# Patient Record
Sex: FEMALE
[De-identification: names, ages, dates, MRNs, and addresses within clinical notes are randomized per-mention and may not be internally consistent; named-entity substitution may affect disease eponyms.]

---

## 2018-02-18 ENCOUNTER — HOSPITAL ENCOUNTER (EMERGENCY)
Dept: HOSPITAL 14 - H.ER | Age: 23
Discharge: HOME | End: 2018-02-18
Payer: COMMERCIAL

## 2018-02-18 VITALS
HEART RATE: 60 BPM | SYSTOLIC BLOOD PRESSURE: 121 MMHG | OXYGEN SATURATION: 98 % | DIASTOLIC BLOOD PRESSURE: 80 MMHG | RESPIRATION RATE: 18 BRPM | TEMPERATURE: 98.6 F

## 2018-02-18 DIAGNOSIS — K52.9: Primary | ICD-10-CM

## 2018-02-18 LAB
ALBUMIN SERPL-MCNC: 4.5 G/DL (ref 3.5–5)
ALBUMIN/GLOB SERPL: 1.1 {RATIO} (ref 1–2.1)
ALT SERPL-CCNC: 42 U/L (ref 9–52)
AST SERPL-CCNC: 32 U/L (ref 14–36)
BASOPHILS # BLD AUTO: 0 K/UL (ref 0–0.2)
BASOPHILS NFR BLD: 0.2 % (ref 0–2)
BUN SERPL-MCNC: 17 MG/DL (ref 7–17)
CALCIUM SERPL-MCNC: 9 MG/DL (ref 8.4–10.2)
EOSINOPHIL # BLD AUTO: 0 K/UL (ref 0–0.7)
EOSINOPHIL NFR BLD: 0.1 % (ref 0–4)
ERYTHROCYTE [DISTWIDTH] IN BLOOD BY AUTOMATED COUNT: 13.6 % (ref 11.5–14.5)
GFR NON-AFRICAN AMERICAN: > 60
HGB BLD-MCNC: 13.5 G/DL (ref 12–16)
LYMPHOCYTE: 3 % (ref 20–50)
LYMPHOCYTES # BLD AUTO: 0.3 K/UL (ref 1–4.3)
LYMPHOCYTES NFR BLD AUTO: 1.8 % (ref 20–40)
MCH RBC QN AUTO: 30.6 PG (ref 27–31)
MCHC RBC AUTO-ENTMCNC: 33.5 G/DL (ref 33–37)
MCV RBC AUTO: 91.4 FL (ref 81–99)
MONOCYTE: 7 % (ref 0–10)
MONOCYTES # BLD: 1 K/UL (ref 0–0.8)
MONOCYTES NFR BLD: 5.9 % (ref 0–10)
NEUTROPHILS # BLD: 15.3 K/UL (ref 1.8–7)
NEUTROPHILS NFR BLD AUTO: 90 % (ref 42–75)
NEUTROPHILS NFR BLD AUTO: 92 % (ref 50–75)
NRBC BLD AUTO-RTO: 0.1 % (ref 0–0)
PLATELET # BLD EST: NORMAL 10*3/UL
PLATELET # BLD: 254 K/UL (ref 130–400)
PMV BLD AUTO: 9 FL (ref 7.2–11.7)
RBC # BLD AUTO: 4.42 MIL/UL (ref 3.8–5.2)
RBC MORPH BLD: NORMAL
TOTAL CELLS COUNTED BLD: 100
WBC # BLD AUTO: 16.7 K/UL (ref 4.8–10.8)

## 2018-02-18 PROCEDURE — 81025 URINE PREGNANCY TEST: CPT

## 2018-02-18 PROCEDURE — 99284 EMERGENCY DEPT VISIT MOD MDM: CPT

## 2018-02-18 PROCEDURE — 74177 CT ABD & PELVIS W/CONTRAST: CPT

## 2018-02-18 PROCEDURE — 80053 COMPREHEN METABOLIC PANEL: CPT

## 2018-02-18 PROCEDURE — 96374 THER/PROPH/DIAG INJ IV PUSH: CPT

## 2018-02-18 PROCEDURE — 85025 COMPLETE CBC W/AUTO DIFF WBC: CPT

## 2018-02-18 NOTE — CT
PROCEDURE:  CT Abdomen and Pelvis without intravenous contrast



HISTORY:

RLQ pain



COMPARISON:

None.



TECHNIQUE:

Technique. 



Contrast Dose: 



Radiation dose:



Total exam DLP =  mGy-cm.



This CT exam was performed using one or more of the following dose 

reduction techniques: Automated exposure control, adjustment of the 

mA and/or kV according to patient size, and/or use of iterative 

reconstruction technique.



FINDINGS:



LOWER THORAX:

Unremarkable. 



LIVER:

Unremarkable. No gross lesion or ductal dilatation.  



GALLBLADDER AND BILE DUCTS:

Unremarkable. 



PANCREAS:

Unremarkable. No gross lesion or ductal dilatation.



SPLEEN:

Unremarkable. 



ADRENALS:

Unremarkable. No mass. 



KIDNEYS AND URETERS:

Unremarkable. No hydronephrosis. No solid mass. 



VASCULATURE:

Unremarkable. No aortic aneurysm. 



BOWEL:

Unremarkable. No obstruction. No gross mural thickening. 



APPENDIX:

Unremarkable. Normal appendix. 



PERITONEUM:

Unremarkable. No free fluid. No free air. 



LYMPH NODES:

Unremarkable. No enlarged lymph nodes. 



BLADDER:

Unremarkable. 



REPRODUCTIVE:

Unremarkable. 



BONES:

No acute fracture. 



OTHER FINDINGS:

13 millimeter left ovarian cyst. .



IMPRESSION:

13 millimeter left ovarian cyst.

## 2018-02-18 NOTE — ED PDOC
HPI: Abdomen


Time Seen by Provider: 02/18/18 07:04


Chief Complaint (Nursing): GI Problem


History Per: Patient


Onset/Duration Of Symptoms: Days (1)


Current Symptoms Are (Timing): Still Present


Severity: Moderate


Location Of Pain/Discomfort: RLQ


Quality Of Discomfort: Unable To Describe


Associated Symptoms: Chills, Nausea, Vomiting, Diarrhea.  denies: Urinary 

Symptoms


Alleviating Factors: None


Additional Complaint(s): 





NVD assoc with RLQ abd pain since last night no fever. Chill. No dysuria





Past Medical History


Vital Signs: 


 Last Vital Signs











Temp  98.6 F   02/18/18 06:52


 


Pulse  60   02/18/18 06:52


 


Resp  18   02/18/18 06:52


 


BP  121/80   02/18/18 06:52


 


Pulse Ox  98   02/18/18 11:00














- Medical History


PMH: No Chronic Diseases





- Family History


Family History: States: Unknown Family Hx





- Home Medications


Home Medications: 


 Ambulatory Orders











 Medication  Instructions  Recorded


 


Ondansetron [Zofran] 4 mg PO Q8H #10 tab 02/18/18














- Allergies


Allergies/Adverse Reactions: 


 Allergies











Allergy/AdvReac Type Severity Reaction Status Date / Time


 


amoxicillin Allergy  RASH Verified 02/18/18 06:52














Review of Systems


ROS Statement: Except As Marked, All Systems Reviewed And Found Negative


Constitutional: Positive for: Chills.  Negative for: Fever


Gastrointestinal: Positive for: Nausea, Vomiting, Abdominal Pain, Diarrhea


Genitourinary Female: Negative for: Dysuria, Frequency


Musculoskeletal: Negative for: Back Pain





Physical Exam





- Physical Exam


Appears: Positive for: Non-toxic, No Acute Distress


Skin: Positive for: Normal Color, Warm, DRY


ENT: Positive for: Other (Mucous membranes dry)


Cardiovascular/Chest: Positive for: Regular Rate, Rhythm


Respiratory: Positive for: CNT, Normal Breath Sounds


Gastrointestinal/Abdominal: Positive for: Bowel Sounds, Soft, Tenderness (RLQ)


Back: Negative for: L CVA Tenderness, R CVA Tenderness


Extremity: Positive for: Normal ROM


Neurologic/Psych: Positive for: Alert, Oriented.  Negative for: Motor/Sensory 

Deficits





- Laboratory Results


Result Diagrams: 


 02/18/18 07:55





 02/18/18 07:55





- ECG


O2 Sat by Pulse Oximetry: 98





Medical Decision Making


Medical Decision Making: 





PROCEDURE:  CT Abdomen and Pelvis without intravenous contrast


HISTORY:


RLQ pain


FINDINGS:


LOWER THORAX:


Unremarkable. 


LIVER:


Unremarkable. No gross lesion or ductal dilatation.  


GALLBLADDER AND BILE DUCTS:


Unremarkable. 


PANCREAS:


Unremarkable. No gross lesion or ductal dilatation.


SPLEEN:


Unremarkable. 


ADRENALS:


Unremarkable. No mass. 


KIDNEYS AND URETERS:


Unremarkable. No hydronephrosis. No solid mass. 


VASCULATURE:


Unremarkable. No aortic aneurysm. 


BOWEL:


Unremarkable. No obstruction. No gross mural thickening. 


APPENDIX:


Unremarkable. Normal appendix. 


PERITONEUM:


Unremarkable. No free fluid. No free air. 


LYMPH NODES:


Unremarkable. No enlarged lymph nodes. 


BLADDER:


Unremarkable. 


REPRODUCTIVE:


Unremarkable. 


BONES:


No acute fracture. 


OTHER FINDINGS:


13 millimeter left ovarian cyst. .


IMPRESSION:


13 millimeter left ovarian cyst.








Disposition





- Clinical Impression


Clinical Impression: 


 Gastroenteritis








- Patient ED Disposition


Is Patient to be Admitted: No


Counseled Patient/Family Regarding: Studies Performed, Diagnosis, Need For 

Followup, Rx Given





- Disposition


Referrals: 


[facility] [Outside]


Disposition: Routine/Home


Disposition Time: 11:29


Condition: FAIR


Prescriptions: 


Ondansetron [Zofran] 4 mg PO Q8H #10 tab


Instructions:  Viral Gastroenteritis, Adult (DC)


Forms:  [facility] (English)